# Patient Record
Sex: FEMALE | Race: BLACK OR AFRICAN AMERICAN | NOT HISPANIC OR LATINO | Employment: STUDENT | URBAN - METROPOLITAN AREA
[De-identification: names, ages, dates, MRNs, and addresses within clinical notes are randomized per-mention and may not be internally consistent; named-entity substitution may affect disease eponyms.]

---
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
## 2025-07-02 ENCOUNTER — HOSPITAL ENCOUNTER (EMERGENCY)
Facility: HOSPITAL | Age: 20
Discharge: HOME | End: 2025-07-03
Attending: EMERGENCY MEDICINE
Payer: MEDICAID

## 2025-07-02 VITALS
RESPIRATION RATE: 16 BRPM | WEIGHT: 185 LBS | HEIGHT: 65 IN | HEART RATE: 75 BPM | SYSTOLIC BLOOD PRESSURE: 120 MMHG | BODY MASS INDEX: 30.82 KG/M2 | DIASTOLIC BLOOD PRESSURE: 57 MMHG | TEMPERATURE: 98 F | OXYGEN SATURATION: 100 %

## 2025-07-02 DIAGNOSIS — Z34.90 PREGNANCY, UNSPECIFIED GESTATIONAL AGE: Primary | ICD-10-CM

## 2025-07-02 LAB
ABSOLUTE EOSINOPHIL (OHS): 0.33 K/UL
ABSOLUTE MONOCYTE (OHS): 0.65 K/UL (ref 0.3–1)
ABSOLUTE NEUTROPHIL COUNT (OHS): 4.96 K/UL (ref 1.8–7.7)
B-HCG UR QL: POSITIVE
BASOPHILS # BLD AUTO: 0.04 K/UL
BASOPHILS NFR BLD AUTO: 0.4 %
CTP QC/QA: YES
ERYTHROCYTE [DISTWIDTH] IN BLOOD BY AUTOMATED COUNT: 14.7 % (ref 11.5–14.5)
HCT VFR BLD AUTO: 36.6 % (ref 40–54)
HGB BLD-MCNC: 12.1 GM/DL (ref 14–18)
IMM GRANULOCYTES # BLD AUTO: 0.03 K/UL (ref 0–0.04)
IMM GRANULOCYTES NFR BLD AUTO: 0.3 % (ref 0–0.5)
LYMPHOCYTES # BLD AUTO: 4.2 K/UL (ref 1–4.8)
MCH RBC QN AUTO: 24.7 PG (ref 27–31)
MCHC RBC AUTO-ENTMCNC: 33.1 G/DL (ref 32–36)
MCV RBC AUTO: 75 FL (ref 82–98)
NUCLEATED RBC (/100WBC) (OHS): 0 /100 WBC
PLATELET # BLD AUTO: 326 K/UL (ref 150–450)
PMV BLD AUTO: 9.7 FL (ref 9.2–12.9)
RBC # BLD AUTO: 4.9 M/UL (ref 4.6–6.2)
RELATIVE EOSINOPHIL (OHS): 3.2 %
RELATIVE LYMPHOCYTE (OHS): 41.1 % (ref 18–48)
RELATIVE MONOCYTE (OHS): 6.4 % (ref 4–15)
RELATIVE NEUTROPHIL (OHS): 48.6 % (ref 38–73)
WBC # BLD AUTO: 10.21 K/UL (ref 3.9–12.7)

## 2025-07-02 PROCEDURE — 81003 URINALYSIS AUTO W/O SCOPE: CPT

## 2025-07-02 PROCEDURE — 85025 COMPLETE CBC W/AUTO DIFF WBC: CPT

## 2025-07-02 PROCEDURE — 87389 HIV-1 AG W/HIV-1&-2 AB AG IA: CPT | Performed by: PHYSICIAN ASSISTANT

## 2025-07-02 PROCEDURE — 80053 COMPREHEN METABOLIC PANEL: CPT

## 2025-07-02 PROCEDURE — 86803 HEPATITIS C AB TEST: CPT | Performed by: PHYSICIAN ASSISTANT

## 2025-07-02 PROCEDURE — 81025 URINE PREGNANCY TEST: CPT

## 2025-07-02 PROCEDURE — 84702 CHORIONIC GONADOTROPIN TEST: CPT

## 2025-07-02 PROCEDURE — 99283 EMERGENCY DEPT VISIT LOW MDM: CPT

## 2025-07-03 LAB
ALBUMIN SERPL BCP-MCNC: 4.1 G/DL (ref 3.5–5.2)
ALP SERPL-CCNC: 60 UNIT/L (ref 40–150)
ALT SERPL W/O P-5'-P-CCNC: 8 UNIT/L (ref 10–44)
ANION GAP (OHS): 11 MMOL/L (ref 8–16)
AST SERPL-CCNC: 14 UNIT/L (ref 11–45)
BILIRUB SERPL-MCNC: 0.3 MG/DL (ref 0.1–1)
BILIRUB UR QL STRIP.AUTO: NEGATIVE
BUN SERPL-MCNC: 14 MG/DL (ref 6–20)
CALCIUM SERPL-MCNC: 9.3 MG/DL (ref 8.7–10.5)
CHLORIDE SERPL-SCNC: 106 MMOL/L (ref 95–110)
CLARITY UR: CLEAR
CO2 SERPL-SCNC: 19 MMOL/L (ref 23–29)
COLOR UR AUTO: YELLOW
CREAT SERPL-MCNC: 0.7 MG/DL (ref 0.5–1.4)
GFR SERPLBLD CREATININE-BSD FMLA CKD-EPI: >60 ML/MIN/1.73/M2
GLUCOSE SERPL-MCNC: 87 MG/DL (ref 70–110)
GLUCOSE UR QL STRIP: NEGATIVE
HCG INTACT+B SERPL-ACNC: NORMAL MIU/ML
HCV AB SERPL QL IA: NORMAL
HGB UR QL STRIP: NEGATIVE
HIV 1+2 AB+HIV1 P24 AG SERPL QL IA: NORMAL
KETONES UR QL STRIP: NEGATIVE
LEUKOCYTE ESTERASE UR QL STRIP: NEGATIVE
NITRITE UR QL STRIP: NEGATIVE
PH UR STRIP: 7 [PH]
POTASSIUM SERPL-SCNC: 3.9 MMOL/L (ref 3.5–5.1)
PROT SERPL-MCNC: 8 GM/DL (ref 6–8.4)
PROT UR QL STRIP: NEGATIVE
SODIUM SERPL-SCNC: 136 MMOL/L (ref 136–145)
SP GR UR STRIP: 1.03
UROBILINOGEN UR STRIP-ACNC: NEGATIVE EU/DL

## 2025-07-03 NOTE — ED TRIAGE NOTES
Meave Fountain, a 19 y.o. adult presents to the ED w/ complaint of lower abdominal pain x 2 weeks, rated 7/10. Pt endorses diarrhea initially but none since. Pt reports hx of ovarian cysts. Pt reports two menstrual cycles in May, last May 26. Pt is Aaox4 GCS 15. Pt denies N/V/D and chest pain    Triage note:  Chief Complaint   Patient presents with    Abdominal Pain     Lower abdominal pain and nausea x2 weeks. Hx of ovarian cysts.      Review of patient's allergies indicates:  No Known Allergies  Past Medical History:   Diagnosis Date    Asthma

## 2025-07-03 NOTE — DISCHARGE INSTRUCTIONS
Your pregnancy test was positive today. You can take over the counter Vitamin B6 and Unisom as needed for nausea. I placed a referral to OBGYN for you to establish care. If you have worsening symptoms or you develop any new symptoms, report to the emergency department for evaluation

## 2025-07-03 NOTE — ED PROVIDER NOTES
Encounter Date: 7/2/2025       History     Chief Complaint   Patient presents with    Abdominal Pain     Lower abdominal pain and nausea x2 weeks. Hx of ovarian cysts.      19-year-old with past medical history of ovarian cysts who presents to the emergency department complaining of intermittent abdominal cramping with nausea for the past 2 weeks.  She reports pain comes and goes, feels like a crampy pain. She denies any fever, chills, chest pain, shortness of breath, diarrhea, vomiting, constipation, vaginal bleeding, vaginal discharge,dysuria, hematuria, urinary frequency or urgency, and any other symptoms at this time.  Reports last menstrual cycle was on May 26, 2025          Review of patient's allergies indicates:  No Known Allergies  Past Medical History:   Diagnosis Date    Asthma      History reviewed. No pertinent surgical history.  Family History   Problem Relation Name Age of Onset    No Known Problems Mother anne marie      Social History[1]  Review of Systems    Physical Exam     Initial Vitals [07/02/25 2210]   BP Pulse Resp Temp SpO2   (!) 120/57 75 16 98.4 °F (36.9 °C) 100 %      MAP       --         Physical Exam    Nursing note and vitals reviewed.  Constitutional: He appears well-developed and well-nourished. He is not diaphoretic. No distress.   HENT:   Head: Normocephalic and atraumatic. Mouth/Throat: Oropharynx is clear and moist.   Eyes: EOM are normal.   Neck: Neck supple.   Normal range of motion.  Cardiovascular:  Normal rate, regular rhythm, normal heart sounds and intact distal pulses.           Pulmonary/Chest: Breath sounds normal. No respiratory distress. He exhibits no tenderness.   Abdominal: Abdomen is soft. He exhibits no distension. There is no abdominal tenderness.   Musculoskeletal:         General: Normal range of motion.      Cervical back: Normal range of motion and neck supple.     Neurological: He is alert and oriented to person, place, and time. He has normal strength.    Skin: Skin is warm and dry. Capillary refill takes less than 2 seconds.   Psychiatric: He has a normal mood and affect.         ED Course   Procedures  Labs Reviewed   COMPREHENSIVE METABOLIC PANEL - Abnormal       Result Value    Sodium 136      Potassium 3.9      Chloride 106      CO2 19 (*)     Glucose 87      BUN 14      Creatinine 0.7      Calcium 9.3      Protein Total 8.0      Albumin 4.1      Bilirubin Total 0.3      ALP 60      AST 14      ALT 8 (*)     Anion Gap 11      eGFR >60     CBC WITH DIFFERENTIAL - Abnormal    WBC 10.21      RBC 4.90      HGB 12.1 (*)     HCT 36.6 (*)     MCV 75 (*)     MCH 24.7 (*)     MCHC 33.1      RDW 14.7 (*)     Platelet Count 326      MPV 9.7      Nucleated RBC 0      Neut % 48.6      Lymph % 41.1      Mono % 6.4      Eos % 3.2      Basophil % 0.4      Imm Grans % 0.3      Neut # 4.96      Lymph # 4.20      Mono # 0.65      Eos # 0.33      Baso # 0.04      Imm Grans # 0.03     POCT URINE PREGNANCY - Abnormal    POC Preg Test, Ur Positive (*)      Acceptable Yes     HEPATITIS C ANTIBODY - Normal    Hep C Ab Interp Non-Reactive     HIV 1 / 2 ANTIBODY - Normal    HIV 1/2 Ag/Ab Non-Reactive     URINALYSIS, REFLEX TO URINE CULTURE - Normal    Color, UA Yellow      Appearance, UA Clear      pH, UA 7.0      Spec Grav UA 1.030      Protein, UA Negative      Glucose, UA Negative      Ketones, UA Negative      Bilirubin, UA Negative      Blood, UA Negative      Nitrites, UA Negative      Urobilinogen, UA Negative      Leukocyte Esterase, UA Negative     HCG, QUANTITATIVE    Beta HCG Quant 22,001.82     CBC W/ AUTO DIFFERENTIAL    Narrative:     The following orders were created for panel order CBC auto differential.  Procedure                               Abnormality         Status                     ---------                               -----------         ------                     CBC with Differential[[id number]]       Abnormal            Final result                  Please view results for these tests on the individual orders.   HEP C VIRUS HOLD SPECIMEN   GREY TOP URINE HOLD          Imaging Results    None          Medications - No data to display  Medical Decision Making  19-year-old with past medical history of ovarian cysts who presents to the emergency department complaining of intermittent abdominal cramping with nausea for the past 2 weeks.    Based on available information and the initial assessment, the working differential diagnoses considered during this evaluation include but are not limited to pregnancy, viral gastroenteritis, UTI    Obtaining labs and will reassess    See ED course    Placed a referral to OBGYN for patient to establish care.  Discussed with patient and family  all pertinent ED information and results. Discussed pt dx and plan of tx. Gave patient all f/u and return to the ED instructions. All questions and concerns were addressed at this time. Patient and family  expresses understanding of information and instructions, and is comfortable with plan to discharge. Pt is stable for discharge.    I discussed with patient and family that evaluation in the ED does not suggest any emergent or life threatening medical conditions requiring immediate intervention beyond what was provided in the ED, and I believe patient is safe for discharge.  Regardless, an unremarkable evaluation in the ED does not preclude the development or presence of a serious of life threatening condition. As such, it was instructed that the patient return immediately for any worsening or change in current symptoms.        Amount and/or Complexity of Data Reviewed  Labs: ordered. Decision-making details documented in ED Course.               ED Course as of 07/03/25 0228   Wed Jul 02, 2025   2327 hCG Qualitative, Urine(!): Positive [ST]   Thu Jul 03, 2025   0111 Absence of leukocytosis.  Microcytic anemia noted [ST]   0111 Comprehensive metabolic panel(!)  Unremarkable [ST]    0118 Beta HCG Quant: 22,001.82 [ST]      ED Course User Index  [ST] Lacey Glover PA-C                           Clinical Impression:  Final diagnoses:  [Z34.90] Pregnancy, unspecified gestational age (Primary)          ED Disposition Condition    Discharge Stable          ED Prescriptions    None       Follow-up Information       Follow up With Specialties Details Why Contact Info    [facility] - Emergency Dept Emergency Medicine Go to  If symptoms worsen [address] [facility] [address] [phone]    Ara Bryson MD Pediatrics Schedule an appointment as soon as possible for a visit in 1 week As needed [address] [place] [address]  [phone]      [facility] - Women's Obstetrics, Obstetrics and Gynecology, Gynecology Schedule an appointment as soon as possible for a visit in 1 week  [address] [facility] [address]  [phone]                     [1]   Social History  Tobacco Use    Smoking status: Never    Smokeless tobacco: Never        Lacey Glover PA-C  07/03/25 0228     Date Of Previous Biopsy (Optional): 4/11/22

## 2025-07-04 LAB — HOLD SPECIMEN: NORMAL

## 2025-07-06 LAB — HOLD SPECIMEN: NORMAL

## 2025-07-09 ENCOUNTER — PATIENT MESSAGE (OUTPATIENT)
Dept: OBSTETRICS AND GYNECOLOGY | Facility: CLINIC | Age: 20
End: 2025-07-09
Payer: MEDICAID

## 2025-07-09 ENCOUNTER — CLINICAL SUPPORT (OUTPATIENT)
Dept: OBSTETRICS AND GYNECOLOGY | Facility: CLINIC | Age: 20
End: 2025-07-09
Payer: MEDICAID

## 2025-07-09 DIAGNOSIS — N91.2 AMENORRHEA: Primary | ICD-10-CM

## 2025-07-09 NOTE — PROGRESS NOTES
Spoke with patient for approximately 30 minutes during OB navigator virtual visit.  Updated chart to reflect up to date patient demographics.  Allergies, medications, pharmacy, medical, surgical and family history updated. Substance and sexual activity, marital status, gender identity and OB/Gyn history updated.   Patient was guided through expectations of care during pregnancy. Pregnancy confirmation, dating u/s & first OB appts scheduled.  New pregnancy education provided & questions answered. Encouraged to send message or call office with any questions/concerns. Verbalized understanding.     Discussed with pt:    Lmp 5/26/2025 ;  Encouraged to CONT taking PNV daily   denies n/v:   common in 1st tri  discussed safe options per Pregnancy A to Z guide   denies cramping/spotting:   may be normal to have mild cramping/light spotting, elias in 1st tri & with sexual activity  Precautions/warning signs discussed:   encouraged to go to ED for excessive vag d/c, saturating a pad, bright red bleeding, painful cramping worse than menstrual cramps/abd pain that is interrupting daily activity  Encouraged and discussed healthy diet:   nothing raw; meat well done; heat deli meats & hot dogs prior to eating   avoid soft cheeses that are not fully pasteurized-ex: brie, feta, blue cheese   avoid fish high in mercury-limit canned tuna to once per week   rinse fruits/veggies thoroughly    Encouraged adequate fluids:  8-12 cups of water/healthy liquids each day  Limit caffeine to <200 mg/day (approx 1-2 cups coffee, tea or soda)   Extra rest can be beneficial, especially during the 1st trimester when it's normal to feel tired   InBasket message to pt through pt portal with information regarding the [email or web address] for access to the Pregnancy A to Z guide and Prenatal Class schedule. Informed of ConnectedMOM program & encouraged to participate, Get Ready to Meet Your Baby pamphlet, Coffective onofre and how to obtain a breast  pump through insurance toward end of pregnancy

## 2025-07-23 ENCOUNTER — HOSPITAL ENCOUNTER (OUTPATIENT)
Dept: PERINATAL CARE | Facility: OTHER | Age: 20
Discharge: HOME | End: 2025-07-23
Attending: OBSTETRICS & GYNECOLOGY
Payer: MEDICAID

## 2025-07-23 DIAGNOSIS — N91.2 AMENORRHEA: ICD-10-CM

## 2025-07-23 PROCEDURE — 76801 OB US < 14 WKS SINGLE FETUS: CPT

## 2025-07-30 ENCOUNTER — OFFICE VISIT (OUTPATIENT)
Dept: OBSTETRICS AND GYNECOLOGY | Facility: CLINIC | Age: 20
End: 2025-07-30
Payer: MEDICAID

## 2025-07-30 VITALS
WEIGHT: 185.88 LBS | BODY MASS INDEX: 30.97 KG/M2 | SYSTOLIC BLOOD PRESSURE: 108 MMHG | DIASTOLIC BLOOD PRESSURE: 60 MMHG | HEIGHT: 65 IN

## 2025-07-30 DIAGNOSIS — Z34.90 PREGNANCY, UNSPECIFIED GESTATIONAL AGE: Primary | ICD-10-CM

## 2025-07-30 PROCEDURE — 3008F BODY MASS INDEX DOCD: CPT | Mod: CPTII,,, | Performed by: ADVANCED PRACTICE MIDWIFE

## 2025-07-30 PROCEDURE — 1159F MED LIST DOCD IN RCRD: CPT | Mod: CPTII,,, | Performed by: ADVANCED PRACTICE MIDWIFE

## 2025-07-30 PROCEDURE — 99202 OFFICE O/P NEW SF 15 MIN: CPT | Mod: S$PBB,TH,UC, | Performed by: ADVANCED PRACTICE MIDWIFE

## 2025-07-30 PROCEDURE — 87491 CHLMYD TRACH DNA AMP PROBE: CPT | Performed by: ADVANCED PRACTICE MIDWIFE

## 2025-07-30 PROCEDURE — 99999 PR PBB SHADOW E&M-EST. PATIENT-LVL III: CPT | Mod: PBBFAC,,, | Performed by: ADVANCED PRACTICE MIDWIFE

## 2025-07-30 PROCEDURE — 87086 URINE CULTURE/COLONY COUNT: CPT | Performed by: ADVANCED PRACTICE MIDWIFE

## 2025-07-30 PROCEDURE — 3078F DIAST BP <80 MM HG: CPT | Mod: CPTII,,, | Performed by: ADVANCED PRACTICE MIDWIFE

## 2025-07-30 PROCEDURE — 99213 OFFICE O/P EST LOW 20 MIN: CPT | Mod: PBBFAC,TH | Performed by: ADVANCED PRACTICE MIDWIFE

## 2025-07-30 PROCEDURE — 3074F SYST BP LT 130 MM HG: CPT | Mod: CPTII,,, | Performed by: ADVANCED PRACTICE MIDWIFE

## 2025-07-31 LAB — BACTERIA UR CULT: NORMAL

## 2025-08-02 LAB
C TRACH DNA SPEC QL NAA+PROBE: NOT DETECTED
CTGC SOURCE (OHS) ORD-325: NORMAL
N GONORRHOEA DNA UR QL NAA+PROBE: NOT DETECTED

## 2025-08-03 NOTE — PROGRESS NOTES
"HISTORY OF PRESENT ILLNESS:    Maeve Fountain is a 19 y.o. female, ,  No LMP recorded. Patient is pregnant.  for a routine exam complaining of amenorrhea.   Pt reports she had irregular cycle in may .    Past Medical History:   Diagnosis Date    Asthma        History reviewed. No pertinent surgical history.    MEDICATIONS AND ALLERGIES:    Current Medications[1]    Review of patient's allergies indicates:  No Known Allergies    Family History   Problem Relation Name Age of Onset    No Known Problems Mother anne marie     Breast cancer Neg Hx      Cervical cancer Neg Hx      Uterine cancer Neg Hx      Colon cancer Neg Hx      Ovarian cancer Neg Hx         Social History[2]    COMPREHENSIVE GYN HISTORY:  PAP History: Denies abnormal Paps.  Infection History: Denies STDs. Denies PID.  Benign History: Denies uterine fibroids. Denies ovarian cysts. Denies endometriosis. Denies other conditions.  Cancer History: Denies cervical cancer. Denies uterine cancer or hyperplasia. Denies ovarian cancer. Denies vulvar cancer or pre-cancer. Denies vaginal cancer or pre-cancer. Denies breast cancer. Denies colon cancer.  Sexual Activity History: Reports currently being sexually active  Menstrual History: None.  Contraception: None    ROS:  GENERAL: No weight changes. No swelling. No fatigue. No fever.  CARDIOVASCULAR: No chest pain. No shortness of breath. No leg cramps.   NEUROLOGICAL: No headaches. No vision changes.  BREASTS: No pain. No lumps. No discharge.  ABDOMEN: No pain. No nausea. No vomiting. No diarrhea. No constipation.  REPRODUCTIVE: No abnormal bleeding.   VULVA: No pain. No lesions. No itching.  VAGINA: No relaxation. No itching. No odor. No discharge. No lesions.  URINARY: No incontinence. No nocturia. No frequency. No dysuria.    /60 (BP Location: Left arm, Patient Position: Sitting)   Ht 5' 5" (1.651 m)   Wt 84.3 kg (185 lb 13.6 oz)   BMI 30.93 kg/m²     PE:  AFFECT: Alert and oriented X 3. " Interactive during exam  GENERAL: Appearance well-nourished, well-developed, in no acute distress.  HEENT: WNL  TEETH: Good dentition.  LUNGS: Easy and unlabored  HEART: Regular rate and rhythm     PROCEDURES:  UPT Positive    DIAGNOSIS:  Gyn exam  IUP with  irregular cycle in may  Dating scan on 07/23/25 with EGA 8w4d, KENNEDI 02/28/25    PLAN:Routine prenatal care    MEDICATIONS PRESCRIBED:PNV    LABS AND TESTS ORDERED:  New Ob Labs      NEW PREGNANCY COUNSELING  Patient was counseled today on:  - Routine prenatal blood tests including HIV and anticipated course of prenatal care  - Prenatal vitamins and folic acid  - Weight gain, nutrition, and exercise  - Seafood and mercury  - Properly heating deli and prepared meats and avoiding unrefrigerated deli  meats, cheeses, and milk products,   - Avoiding cat litter and raw meats due to risk of Toxoplasmosis precautions   - Accuracy of the LMP-based KENNEDI and the value of an early TV-u/s  - Aneuploidy and neural tube screening -- cffDNA, sequential screening, and AFP screen at 15 weeks  - OTC medication in the first trimester  - Harmful effects of smoking, etOH, and recreational drugs  - MFM u/s  at 18-20 weeks.  - Common complaints of pregnancy  - Seat belt use  - Childbirth classes and hospital facilities  - All questions were answered    TERATOLOGY COUNSELING:   Discussed indications and options for aneuploidy screening - pamphlets given    - Pain and bleeding precautions given    - Return to clinic in 4 weeks    Guillermina Ernst CNM    OB/GYN    Total time of 20 minutes, including face-to-face time and non-face-to-face time preparing to see the patient (eg, review of tests), obtaining and/or reviewing separately obtained history, documenting clinical information in the electronic or other health record, independently interpreting results, communicating results to the patient/family/caregiver, or care coordination.       [1]   Current Outpatient Medications:     prenatal vit  no.124/iron/folic (PRENATAL VITAMIN ORAL), Take by mouth., Disp: , Rfl:   [2]   Social History  Socioeconomic History    Marital status: Single     Spouse name: Jomar   Tobacco Use    Smoking status: Never     Passive exposure: Never    Smokeless tobacco: Never   Substance and Sexual Activity    Alcohol use: Never    Drug use: Never    Sexual activity: Yes     Partners: Male     Birth control/protection: None   Social History Narrative    Is in the 9th grade    Dose not smoke    Has two siblings     Social Drivers of Health     Financial Resource Strain: Low Risk  (7/30/2025)    Overall Financial Resource Strain (CARDIA)     Difficulty of Paying Living Expenses: Not very hard   Food Insecurity: No Food Insecurity (7/30/2025)    Hunger Vital Sign     Worried About Running Out of Food in the Last Year: Never true     Ran Out of Food in the Last Year: Never true   Transportation Needs: No Transportation Needs (7/30/2025)    PRAPARE - Transportation     Lack of Transportation (Medical): No     Lack of Transportation (Non-Medical): No   Physical Activity: Sufficiently Active (7/30/2025)    Exercise Vital Sign     Days of Exercise per Week: 5 days     Minutes of Exercise per Session: 90 min   Stress: No Stress Concern Present (7/30/2025)    [employer] - Occupational Stress Questionnaire     Feeling of Stress : Not at all   Housing Stability: Low Risk  (7/30/2025)    Housing Stability Vital Sign     Unable to Pay for Housing in the Last Year: No     Number of Times Moved in the Last Year: 0     Homeless in the Last Year: No

## 2025-08-13 ENCOUNTER — PATIENT MESSAGE (OUTPATIENT)
Dept: OBSTETRICS AND GYNECOLOGY | Facility: CLINIC | Age: 20
End: 2025-08-13
Payer: MEDICAID

## 2025-08-14 ENCOUNTER — LAB VISIT (OUTPATIENT)
Dept: LAB | Facility: OTHER | Age: 20
End: 2025-08-14
Attending: ADVANCED PRACTICE MIDWIFE
Payer: MEDICAID

## 2025-08-14 DIAGNOSIS — Z34.90 PREGNANCY, UNSPECIFIED GESTATIONAL AGE: ICD-10-CM

## 2025-08-14 LAB
HBV SURFACE AG SERPL QL IA: NORMAL
INDIRECT COOMBS: NORMAL
RH BLD: NORMAL
SPECIMEN OUTDATE: NORMAL
T PALLIDUM IGG+IGM SER QL: NORMAL

## 2025-08-14 PROCEDURE — 86762 RUBELLA ANTIBODY: CPT

## 2025-08-14 PROCEDURE — 83021 HEMOGLOBIN CHROMOTOGRAPHY: CPT

## 2025-08-14 PROCEDURE — 36415 COLL VENOUS BLD VENIPUNCTURE: CPT

## 2025-08-14 PROCEDURE — 86593 SYPHILIS TEST NON-TREP QUANT: CPT

## 2025-08-14 PROCEDURE — 86850 RBC ANTIBODY SCREEN: CPT | Performed by: ADVANCED PRACTICE MIDWIFE

## 2025-08-14 PROCEDURE — 87340 HEPATITIS B SURFACE AG IA: CPT

## 2025-08-15 PROBLEM — D56.3 BETA THALASSEMIA TRAIT: Status: ACTIVE | Noted: 2025-08-15

## 2025-08-15 LAB
HGB A2 MFR BLD HPLC: 5.1 % (ref 2.2–3.2)
HGB FRACT BLD ELPH-IMP: ABNORMAL
PATHOLOGIST INTERPRETATION - HGB SERUM (OHS): ABNORMAL
RUBV IGG SER-ACNC: 22.4 IU/ML
RUBV IGG SER-IMP: REACTIVE